# Patient Record
Sex: MALE | Race: WHITE | Employment: OTHER | URBAN - METROPOLITAN AREA
[De-identification: names, ages, dates, MRNs, and addresses within clinical notes are randomized per-mention and may not be internally consistent; named-entity substitution may affect disease eponyms.]

---

## 2024-10-16 RX ORDER — ROSUVASTATIN CALCIUM 10 MG/1
10 TABLET, COATED ORAL NIGHTLY
COMMUNITY

## 2024-10-16 RX ORDER — MULTIVIT-MIN/IRON/FOLIC ACID/K 18-600-40
1 CAPSULE ORAL WEEKLY
COMMUNITY

## 2024-10-16 NOTE — H&P
[facility]  part of [facility]    History and Physical    Mart Vanegas Patient Status:  Hospital Outpatient Surgery    1965 MRN [id number]   Location [facility] OPERATING ROOM Attending Lexa Lopez MD   Hosp Day # 0 PCP No primary care provider on file.     Date:  10/16/2024  Date of Admission:  10/17/24    History provided by:patient  HPI:   No chief complaint on file.    HPI  Juan is here today to discuss his left knee.  He had an ACL reconstruction in the .  He also had a partial meniscectomy.  Currently has lateral joint line pain and recent MRI shows a lateral meniscus tear consistent with the symptoms.  Cartledge surfaces have decreased thickness but no complete bone-on-bone.  We did Scearce view today to confirm no bone-on-bone or greater than 50% joint space narrowing for the medial lateral compartment.  Cortisone injection only gave only few weeks of relief.  He is here to talk about arthroscopy.    He is healthy and in good shape physically.  He lives independently with his wife.  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  He has no allergies and takes no anticoagulation.  No red flags such as chest pain, shortness of breath, history of DVT.  History   No past medical history on file.  No past surgical history on file.  No family history on file.  Social History:  Social History     Socioeconomic History    Marital status:      Allergies/Medications:   Allergies: Allergies[1]  No medications prior to admission.       Review of Systems:   Review of Systems    No fever, chills, night sweats, chest pain, shortness of breath, chest palpitations, nausea, vomiting, or diarrhea.    Physical Exam:   Vital Signs:  There were no vitals taken for this visit.  Physical Exam    Left knee without asymmetric warmth or effusion.  Midline scar consistent with ACL reconstruction using auto Carol bone patella bone.   Healthy skin on the leg with no calf tenderness or pitting edema.  Good motion 0 to 130 degrees.  Tenderness on the posterior lateral joint line.  Artis lima is negative for crepitus and pain and occasionally he will have some medial pain but not today.  His Lachman is positive but his pivot shift is negative and clinically he does not complain of instability.    Previous x-rays as well as today's he review shows good joint space maintenance in all 3 compartments.  There is mild medial narrowing and a show this time today.  Also showed him the MRI again for lateral meniscus tear and the resected medial meniscus.  The ACL is intact but has some degeneration.    Results:   No results found for: \"WBC\", \"HGB\", \"HCT\", \"PLT\", \"CREATSERUM\", \"BUN\", \"NA\", \"K\", \"CL\", \"CO2\", \"GLU\", \"CA\", \"ALB\", \"ALKPHO\", \"BILT\", \"TP\", \"AST\", \"ALT\", \"PTT\", \"INR\", \"PT\", \"T4F\", \"TSH\", \"TSHREFLEX\", \"KAROL\", \"LIP\", \"GGT\", \"PSA\", \"DDIMER\", \"ESRML\", \"ESRPF\", \"CRP\", \"BNP\", \"MG\", \"PHOS\", \"TROP\", \"TROPHS\", \"CK\", \"CKMB\", \"SUJATHA\", \"RPR\", \"B12\", \"ETOH\", \"POCGLU\"  No results found.        Assessment/Plan:     * No active hospital problems. *      1.  Left knee lateral meniscus tear    The patient failed cortisone injection.  He is in good shape physically so weight loss is not an option.  He wishes to proceed with arthroscopy.    We discussed potential risk of surgery such as death, heart attack, stroke, bleeding, infection, blood clot, nerve injury, artery injury, recurrent meniscus tear, and progression of arthritis.  Again Dr. Lopez discussed that we would not be addressing any ACL concerns and we cannot reverse the arthritis with the arthroscopy.  He understood.  He has questions which are answered to his satisfaction.  He wishes to proceed.    The plan will be arthroscopy of the left knee with partial lateral meniscectomy.  We will check the medial meniscus to see if he needs some cleaning however.        ERIKA Alfaro  10/16/2024         [1] No Known  Allergies

## 2024-10-17 ENCOUNTER — HOSPITAL ENCOUNTER (OUTPATIENT)
Facility: HOSPITAL | Age: 59
Setting detail: HOSPITAL OUTPATIENT SURGERY
Discharge: HOME | End: 2024-10-17
Attending: ORTHOPAEDIC SURGERY | Admitting: ORTHOPAEDIC SURGERY
Payer: COMMERCIAL

## 2024-10-17 ENCOUNTER — ANESTHESIA (OUTPATIENT)
Dept: SURGERY | Facility: HOSPITAL | Age: 59
End: 2024-10-17
Payer: COMMERCIAL

## 2024-10-17 ENCOUNTER — ANESTHESIA EVENT (OUTPATIENT)
Dept: SURGERY | Facility: HOSPITAL | Age: 59
End: 2024-10-17
Payer: COMMERCIAL

## 2024-10-17 VITALS
OXYGEN SATURATION: 99 % | RESPIRATION RATE: 16 BRPM | SYSTOLIC BLOOD PRESSURE: 120 MMHG | BODY MASS INDEX: 31.37 KG/M2 | TEMPERATURE: 98 F | HEART RATE: 94 BPM | WEIGHT: 207 LBS | DIASTOLIC BLOOD PRESSURE: 56 MMHG | HEIGHT: 68 IN

## 2024-10-17 DIAGNOSIS — S83.232D COMPLEX TEAR OF MEDIAL MENISCUS OF LEFT KNEE, SUBSEQUENT ENCOUNTER: Primary | ICD-10-CM

## 2024-10-17 PROBLEM — M65.962 SYNOVITIS OF LEFT KNEE: Status: ACTIVE | Noted: 2024-10-17

## 2024-10-17 PROBLEM — M23.42: Status: ACTIVE | Noted: 2024-10-17

## 2024-10-17 PROCEDURE — 0SCD4ZZ EXTIRPATION OF MATTER FROM LEFT KNEE JOINT, PERCUTANEOUS ENDOSCOPIC APPROACH: ICD-10-PCS | Performed by: ORTHOPAEDIC SURGERY

## 2024-10-17 PROCEDURE — 0SBD4ZZ EXCISION OF LEFT KNEE JOINT, PERCUTANEOUS ENDOSCOPIC APPROACH: ICD-10-PCS | Performed by: ORTHOPAEDIC SURGERY

## 2024-10-17 RX ORDER — MORPHINE SULFATE 10 MG/ML
6 INJECTION, SOLUTION INTRAMUSCULAR; INTRAVENOUS EVERY 10 MIN PRN
Status: DISCONTINUED | OUTPATIENT
Start: 2024-10-17 | End: 2024-10-17

## 2024-10-17 RX ORDER — MORPHINE SULFATE 4 MG/ML
2 INJECTION, SOLUTION INTRAMUSCULAR; INTRAVENOUS EVERY 10 MIN PRN
Status: DISCONTINUED | OUTPATIENT
Start: 2024-10-17 | End: 2024-10-17

## 2024-10-17 RX ORDER — METOCLOPRAMIDE HYDROCHLORIDE 5 MG/ML
10 INJECTION INTRAMUSCULAR; INTRAVENOUS ONCE
Status: COMPLETED | OUTPATIENT
Start: 2024-10-17 | End: 2024-10-17

## 2024-10-17 RX ORDER — SODIUM CHLORIDE, SODIUM LACTATE, POTASSIUM CHLORIDE, CALCIUM CHLORIDE 600; 310; 30; 20 MG/100ML; MG/100ML; MG/100ML; MG/100ML
INJECTION, SOLUTION INTRAVENOUS CONTINUOUS
Status: DISCONTINUED | OUTPATIENT
Start: 2024-10-17 | End: 2024-10-17

## 2024-10-17 RX ORDER — IBUPROFEN 200 MG
600 TABLET ORAL EVERY 8 HOURS PRN
Qty: 40 TABLET | Refills: 0 | Status: SHIPPED | OUTPATIENT
Start: 2024-10-17

## 2024-10-17 RX ORDER — MORPHINE SULFATE 4 MG/ML
4 INJECTION, SOLUTION INTRAMUSCULAR; INTRAVENOUS EVERY 10 MIN PRN
Status: DISCONTINUED | OUTPATIENT
Start: 2024-10-17 | End: 2024-10-17

## 2024-10-17 RX ORDER — ROPIVACAINE HYDROCHLORIDE 5 MG/ML
INJECTION, SOLUTION EPIDURAL; INFILTRATION; PERINEURAL AS NEEDED
Status: DISCONTINUED | OUTPATIENT
Start: 2024-10-17 | End: 2024-10-17 | Stop reason: HOSPADM

## 2024-10-17 RX ORDER — ONDANSETRON 2 MG/ML
4 INJECTION INTRAMUSCULAR; INTRAVENOUS EVERY 6 HOURS PRN
Status: CANCELLED | OUTPATIENT
Start: 2024-10-17

## 2024-10-17 RX ORDER — METOCLOPRAMIDE 10 MG/1
10 TABLET ORAL ONCE
Status: COMPLETED | OUTPATIENT
Start: 2024-10-17 | End: 2024-10-17

## 2024-10-17 RX ORDER — FAMOTIDINE 20 MG/1
20 TABLET, FILM COATED ORAL ONCE
Status: COMPLETED | OUTPATIENT
Start: 2024-10-17 | End: 2024-10-17

## 2024-10-17 RX ORDER — HYDRALAZINE HYDROCHLORIDE 20 MG/ML
5 INJECTION INTRAMUSCULAR; INTRAVENOUS EVERY 10 MIN PRN
Status: DISCONTINUED | OUTPATIENT
Start: 2024-10-17 | End: 2024-10-17

## 2024-10-17 RX ORDER — ACETAMINOPHEN 500 MG
1000 TABLET ORAL EVERY 8 HOURS PRN
Qty: 40 TABLET | Refills: 0 | Status: SHIPPED | OUTPATIENT
Start: 2024-10-17

## 2024-10-17 RX ORDER — ASPIRIN 81 MG/1
81 TABLET ORAL 2 TIMES DAILY WITH MEALS
Qty: 24 TABLET | Refills: 0 | Status: SHIPPED | OUTPATIENT
Start: 2024-10-17

## 2024-10-17 RX ORDER — FAMOTIDINE 10 MG/ML
20 INJECTION, SOLUTION INTRAVENOUS ONCE
Status: COMPLETED | OUTPATIENT
Start: 2024-10-17 | End: 2024-10-17

## 2024-10-17 RX ORDER — LIDOCAINE HYDROCHLORIDE 10 MG/ML
INJECTION, SOLUTION EPIDURAL; INFILTRATION; INTRACAUDAL; PERINEURAL AS NEEDED
Status: DISCONTINUED | OUTPATIENT
Start: 2024-10-17 | End: 2024-10-17 | Stop reason: SURG

## 2024-10-17 RX ORDER — HYDROMORPHONE HYDROCHLORIDE 1 MG/ML
0.2 INJECTION, SOLUTION INTRAMUSCULAR; INTRAVENOUS; SUBCUTANEOUS EVERY 5 MIN PRN
Status: DISCONTINUED | OUTPATIENT
Start: 2024-10-17 | End: 2024-10-17

## 2024-10-17 RX ORDER — HYDROMORPHONE HYDROCHLORIDE 1 MG/ML
0.6 INJECTION, SOLUTION INTRAMUSCULAR; INTRAVENOUS; SUBCUTANEOUS EVERY 5 MIN PRN
Status: DISCONTINUED | OUTPATIENT
Start: 2024-10-17 | End: 2024-10-17

## 2024-10-17 RX ORDER — HYDROMORPHONE HYDROCHLORIDE 1 MG/ML
0.4 INJECTION, SOLUTION INTRAMUSCULAR; INTRAVENOUS; SUBCUTANEOUS EVERY 5 MIN PRN
Status: DISCONTINUED | OUTPATIENT
Start: 2024-10-17 | End: 2024-10-17

## 2024-10-17 RX ORDER — ACETAMINOPHEN 500 MG
1000 TABLET ORAL ONCE
Status: COMPLETED | OUTPATIENT
Start: 2024-10-17 | End: 2024-10-17

## 2024-10-17 RX ORDER — NALOXONE HYDROCHLORIDE 0.4 MG/ML
0.08 INJECTION, SOLUTION INTRAMUSCULAR; INTRAVENOUS; SUBCUTANEOUS AS NEEDED
Status: DISCONTINUED | OUTPATIENT
Start: 2024-10-17 | End: 2024-10-17

## 2024-10-17 RX ORDER — TRAMADOL HYDROCHLORIDE 50 MG/1
50 TABLET ORAL EVERY 8 HOURS PRN
Qty: 15 TABLET | Refills: 0 | Status: SHIPPED | OUTPATIENT
Start: 2024-10-17

## 2024-10-17 RX ORDER — HYDRALAZINE HYDROCHLORIDE 20 MG/ML
10 INJECTION INTRAMUSCULAR; INTRAVENOUS EVERY 10 MIN PRN
Status: DISCONTINUED | OUTPATIENT
Start: 2024-10-17 | End: 2024-10-17

## 2024-10-17 RX ORDER — MIDAZOLAM HYDROCHLORIDE 1 MG/ML
INJECTION INTRAMUSCULAR; INTRAVENOUS AS NEEDED
Status: DISCONTINUED | OUTPATIENT
Start: 2024-10-17 | End: 2024-10-17 | Stop reason: SURG

## 2024-10-17 RX ADMIN — LIDOCAINE HYDROCHLORIDE 50 MG: 10 INJECTION, SOLUTION EPIDURAL; INFILTRATION; INTRACAUDAL; PERINEURAL at 17:55:00

## 2024-10-17 RX ADMIN — SODIUM CHLORIDE, SODIUM LACTATE, POTASSIUM CHLORIDE, CALCIUM CHLORIDE: 600; 310; 30; 20 INJECTION, SOLUTION INTRAVENOUS at 19:16:00

## 2024-10-17 RX ADMIN — MIDAZOLAM HYDROCHLORIDE 2 MG: 1 INJECTION INTRAMUSCULAR; INTRAVENOUS at 17:55:00

## 2024-10-17 RX ADMIN — SODIUM CHLORIDE, SODIUM LACTATE, POTASSIUM CHLORIDE, CALCIUM CHLORIDE: 600; 310; 30; 20 INJECTION, SOLUTION INTRAVENOUS at 17:49:00

## 2024-10-17 NOTE — ANESTHESIA PROCEDURE NOTES
Airway  Date/Time: 10/17/2024 6:05 PM  Urgency: Elective      General Information and Staff    Patient location during procedure: OR  Anesthesiologist: Pedro Pablo Shaikh MD  Performed: anesthesiologist   Performed by: Pedro Pablo Shaikh MD  Authorized by: Pedro Pablo Shaikh MD      Indications and Patient Condition  Indications for airway management: anesthesia  Spontaneous ventilation: present  Sedation level: deep  Preoxygenated: yes  Patient position: sniffing  Mask difficulty assessment: 1 - vent by mask    Final Airway Details  Final airway type: supraglottic airway      Successful airway: classic  Size 4       Number of attempts at approach: 1

## 2024-10-17 NOTE — INTERVAL H&P NOTE
Pre-op Diagnosis: Left knee lateral meniscus tear    The above referenced H&P was reviewed by Lexa Lopez MD on 10/17/2024, the patient was examined and no significant changes have occurred in the patient's condition since the H&P was performed.  I discussed with the patient and/or legal representative the potential benefits, risks and side effects of this procedure; the likelihood of the patient achieving goals; and potential problems that might occur during recuperation.  I discussed reasonable alternatives to the procedure, including risks, benefits and side effects related to the alternatives and risks related to not receiving this procedure.  We will proceed with procedure as planned.

## 2024-10-17 NOTE — ANESTHESIA PREPROCEDURE EVALUATION
Anesthesia PreOp Note    HPI:     Mart Vanegas is a 59 year old male who presents for preoperative consultation requested by: Lexa Lopez MD    Date of Surgery: 10/17/2024    Procedure(s):  Arthroscopy left knee, partial lateral meniscectomy  Indication: Left knee lateral meniscus tear    Relevant Problems   No relevant active problems       NPO:  Last Liquid Consumption Date: 10/16/24  Last Liquid Consumption Time: 1800  Last Solid Consumption Date: 10/16/24  Last Solid Consumption Time: 1600  Last Liquid Consumption Date: 10/16/24          History Review:  There are no active problems to display for this patient.      Past Medical History:    High cholesterol    Visual impairment    Reading glasses       Past Surgical History:   Procedure Laterality Date    Cholecystectomy      Other surgical history      kidney stones removed       Prescriptions Prior to Admission[1]  Current Medications and Prescriptions Ordered in Epic[2]    Allergies[3]    Family History   Problem Relation Age of Onset    Diabetes Father     Heart Disorder Mother      Social History     Socioeconomic History    Marital status:    Tobacco Use    Smoking status: Former     Types: Cigarettes    Smokeless tobacco: Never    Tobacco comments:     Quit 35 yrs ago   Vaping Use    Vaping status: Never Used   Substance and Sexual Activity    Alcohol use: Not Currently     Comment: very rarely, last drink 30 yrs ago    Drug use: Never       Available pre-op labs reviewed.             Vital Signs:  Body mass index is 31.47 kg/m².   height is 1.727 m (5' 8\") and weight is 93.9 kg (207 lb). His oral temperature is 98.2 °F (36.8 °C). His blood pressure is 131/74 and his pulse is 67. His respiration is 18 and oxygen saturation is 98%.   Vitals:    10/16/24 0904 10/17/24 1354   BP:  131/74   Pulse:  67   Resp:  18   Temp:  98.2 °F (36.8 °C)   TempSrc:  Oral   SpO2:  98%   Weight: 95.3 kg (210 lb) 93.9 kg (207 lb)   Height: 1.727 m (5' 8\") 1.727 m  (5' 8\")        Anesthesia Evaluation      Airway   Mallampati: I  TM distance: >3 FB  Neck ROM: full  Dental      Pulmonary - normal exam   Cardiovascular - normal exam    Neuro/Psych      GI/Hepatic/Renal      Endo/Other    Abdominal   (+) obese                 Anesthesia Plan:   ASA:  2  Plan:   General  Airway:  LMA  Informed Consent Plan and Risks Discussed With:  Patient      I have informed Mart Vanegas and/or legal guardian or family member of the nature of the anesthetic plan, benefits, risks including possible dental damage if relevant, major complications, and any alternative forms of anesthetic management.   All of the patient's questions were answered to the best of my ability. The patient desires the anesthetic management as planned.  VÍCTOR HUDSON MD  10/17/2024 4:54 PM  Present on Admission:  **None**           [1]   Medications Prior to Admission   Medication Sig Dispense Refill Last Dose/Taking    rosuvastatin 10 MG Oral Tab Take 1 tablet (10 mg total) by mouth nightly.   10/13/2024    Cholecalciferol (VITAMIN D) 50 MCG (2000 UT) Oral Cap Take 1 capsule (2,000 Units total) by mouth once a week. Q Saturday   10/4/2024   [2]   Current Facility-Administered Medications Ordered in Epic   Medication Dose Route Frequency Provider Last Rate Last Admin    lactated ringers infusion   Intravenous Continuous Lexa Lopez MD 20 mL/hr at 10/17/24 1420 New Bag at 10/17/24 1420    ceFAZolin (Ancef) 2g in 10mL IV syringe premix  2 g Intravenous Once Lexa Lopez MD         No current [facility]-ordered outpatient medications on file.   [3] No Known Allergies

## 2024-10-17 NOTE — BRIEF OP NOTE
Pre-Operative Diagnosis: Left knee lateral meniscus tear     Post-Operative Diagnosis: Left knee with 1) medial and lateral meniscus tears, 2) loose body 1 cm, 3) synovitis medial     Procedure Performed: Arthroscopy left knee with 1) partial medial and lateral meniscectomies, 2) excision of loose body, 3) synovectomy medial left knee      Surgeons and Role:     * Lexa Lopez MD - Primary    Assistant(s):  PA: Ros Etienne PA    Anesthesia: Dr. Shaikh with general laryngeal mask anesthesia     Surgical Findings: ACL reconstruction intact but slightly frayed..  Grade II chondromalacia medial compartment and degenerative medial meniscus tear at posterior attachment debrided.  Loose body found in the posterior capsule near medial meniscus posterior  attachment.  Lateral compartment with grade I chondromalacia.  Anterior lateral meniscal tear debrided with ArthroWand and shaver.  Lateral compartment tight. I could not enter posterior aspect..  Patellofemoral joint with grade III chondromalacia.   Tourniquet: 43 minutes at 300 mmHg.  Drain: None  Specimen: None  Complications: None  Estimated Blood Loss: 5 cc  Stable to PACU.    Lexa Lopez MD  10/17/2024  5:58 PM

## 2024-10-18 NOTE — OPERATIVE REPORT
[facility]    PATIENT'S NAME: ISI NOLASCO   ATTENDING PHYSICIAN: Lexa Lopez MD   OPERATING PHYSICIAN: Lexa Lopez MD   PATIENT ACCOUNT#:   [id number]    LOCATION:  [facility] 10  MEDICAL RECORD #:   [id number]       YOB: 1965  ADMISSION DATE:       10/17/2024      OPERATION DATE:  10/17/2024    OPERATIVE REPORT      PREOPERATIVE DIAGNOSIS:  Left knee lateral meniscus tear.  POSTOPERATIVE DIAGNOSIS:  Left knee with:  1.   Medial and lateral meniscal tears.  2.   Loose body, 1 cm.  3.   Synovitis medially.  PROCEDURE:  Arthroscopy of left knee with:  1.   Partial medial and lateral meniscectomies.  2.   Excision of loose body.  3.   Synovectomy, medial left knee.    ASSISTANT:  Ros Etienne PA-C.    ANESTHESIA:  Dr. Shaikh with general laryngeal mask anesthesia.    INDICATIONS:  Patient is a 59-year-old man who had ACL reconstruction in the 1990s.  He has lateral greater than medial joint line pain.  MRI shows anterolateral meniscal tear.  He has failed nonoperative treatment.  We discussed options of potential revision ACL reconstruction versus just simple meniscectomy.  He opted for the latter.  He did not want to proceed with ACL reconstruction should it be necessary at this time.  He does not complain of instability even though on exam, he does have slight instability to his ACL.  The risks and complications of surgery were discussed, and no guarantees were given.  The consent was signed.    OPERATIVE TECHNIQUE:  Patient was brought to the operating room, placed in supine position.  Appropriate IV access and monitors were placed.  Ancef 2 g IV was given as a prophylaxis.  General laryngeal mask anesthesia was performed by Dr. Shaikh.  SCD boot was on the right leg.  Tourniquet and Sinan leg bean placed on the left leg.  The right leg was supported on a padded bump and the end of the table let down.  The left lower extremity was then prepped and draped in  sterile fashion with ChloraPrep.  The leg was exsanguinated, and the tourniquet inflated to 300 mmHg.  Tourniquet time for the case was 43 minutes.  Portals were created, and the suprapatellar pouch was unremarkable.  The patella had grade 3 chondromalacia.  The trochlea was also with grade 3 chondromalacia.  The medial and lateral gutters did not show loose bodies.  The medial compartment was entered.  There was a posterior attachment tear that was debrided with a shaver and ArthroWand.  While we were doing this, the loose body was evident in the posterior capsule.  I was able to grab it with a pituitary, and it measured about 1 cm.  Later, I took a picture of it on a towel with a ruler to document that it was 1 cm.  It was not sent to Pathology.    The ACL reconstruction was intact, although frayed.  Some loose fraying was debrided with the ArthroWand.  It had some integrity, but it was mildly stretched.    The lateral compartment could not be entered.  I switched the scope from the lateral to the medial side and I still could not enter deeply.  There was some tearing of the anterior horn, which I debrided with an ArthroWand.    The medial synovitis that was documented by pictures was debrided with shaver and ArthroWand.    A second look showed no loose fragments.  The equipment was removed and wounds closed with nylon suture.  He was injected with Naropin 0.5% 30 mL for postop pain relief.  Dressings were applied after the wounds were closed  with nylon suture, and the patient was awakened, extubated, and brought to Recovery in stable condition.  Postoperative instructions were given to him in both written and oral form and he will follow up Dr. Lopez in 1 week's time in the office.    Blood loss was 5 mL.  No specimens were sent.  No drains used.  No complications were noted.  Patient tolerated the procedure well.    Dictated By Lexa Lopez MD  d: 10/17/2024 19:06:52  t: 10/18/2024  01:44:28  [facility] [id number]  [facility]/    cc: Kimberlyn Wilkinson MD

## 2024-10-18 NOTE — DISCHARGE INSTRUCTIONS
Starting today, work on bending and straightening the knee.  Pump your feet often to try to prevent blood clots.  You can bear full weight on the left leg with your crutches.  You can stop crutches when you feel comfortable and safe without them.  You can go up and down the stairs when you feel safe on them.  No driving unless you have almost no pain and it is not distracting and have stopped taking tramadol.  When you are not walking, you should have your leg elevated.     Leave bandage on and dry for 2 days. May remove on Saturday . Sponge bath only until dressing is removed.  On Saturday once dressing is removed you may take a regular shower.  Wash wounds gently with soap and water using your hands. Pat dry.  Do not soak.      Apply bacitracin or triple antibiotic ointment and cover with 3 clean Band-Aids.  Ice for 20 minutes at a time.  Once the big bandage is off, use a small washcloth or towel between your skin and the ice.      Take medications as prescribed on discharge instructions.      Call to make an appointment with Dr. Lopez's office for suture removal in 10-14 days.          [facility] HOME CARE INSTRUCTIONS: POST-OP ANESTHESIA  The medication that you received for sedation or general anesthesia can last up to 24 hours. Your judgment and reflexes may be altered, even if you feel like your normal self.      We Recommend:   Do not drive any motor vehicle or bicycle   Avoid mowing the lawn, playing sports, or working with power tools/applicances (power saws, electric knives or mixers)   That you have someone stay with you on your first night home   Do not drink alcohol or take sleeping pills or tranquilizers   Do not sign legal documents within 24 hours of your procedure   If you had a nerve block for your surgery, take extra care not to put any pressure on your arm or hand for 24 hours    It is normal:  For you to have a sore throat if you had a breathing tube during surgery (while you were asleep!).  The sore throat should get better within 48 hours. You can gargle with warm salt water (1/2 tsp in 4 oz warm water) or use a throat lozenge for comfort  To feel muscle aches or soreness especially in the abdomen, chest or neck. The achy feeling should go away in the next 24 hours  To feel weak, sleepy or \"wiped out\". Your should start feeling better in the next 24 hours.   To experience mild discomforts such as sore lip or tongue, headache, cramps, gas pains or a bloated feeling in your abdomen.   To experience mild back pain or soreness for a day or two if you had spinal or epidural anesthesia.   If you had laparoscopic surgery, to feel shoulder pain or discomfort on the day of surgery.   For some patients to have nausea after surgery/anesthesia    If you feel nausea or experience vomiting:   Try to move around less.   Eat less than usual or drink only liquids until the next morning   Nausea should resolve in about 24 hours    If you have a problem when you are at home:    Call your surgeons office     Discharge Instructions: After Your Surgery  You’ve just had surgery. During surgery, you were given medicine called anesthesia to keep you relaxed and free of pain. After surgery, you may have some pain or nausea. This is common. Here are some tips for feeling better and getting well after surgery.   Going home  Your healthcare provider will show you how to take care of yourself when you go home. They'll also answer your questions. Have an adult family member or friend drive you home. For the first 24 hours after your surgery:   Don't drive or use heavy equipment.  Don't make important decisions or sign legal papers.  Take medicines as directed.  Don't drink alcohol.  Have someone stay with you, if needed. They can watch for problems and help keep you safe.  Be sure to go to all follow-up visits with your healthcare provider. And rest after your surgery for as long as your provider tells you to.   Coping with  pain  If you have pain after surgery, pain medicine will help you feel better. Take it as directed, before pain becomes severe. Also, ask your healthcare provider or pharmacist about other ways to control pain. This might be with heat, ice, or relaxation. And follow any other instructions your surgeon or nurse gives you.      Stay on schedule with your medicine.      Tips for taking pain medicine  To get the best relief possible, remember these points:   Pain medicines can upset your stomach. Taking them with a little food may help.  Most pain relievers taken by mouth need at least 20 to 30 minutes to start to work.  Don't wait till your pain becomes severe before you take your medicine. Try to time your medicine so that you can take it before starting an activity. This might be before you get dressed, go for a walk, or sit down for dinner.  Constipation is a common side effect of some pain medicines. Call your healthcare provider before taking any medicines such as laxatives or stool softeners to help ease constipation. Also ask if you should skip any foods. Drinking lots of fluids and eating foods such as fruits and vegetables that are high in fiber can also help. Remember, don't take laxatives unless your surgeon has prescribed them.  Drinking alcohol and taking pain medicine can cause dizziness and slow your breathing. It can even be deadly. Don't drink alcohol while taking pain medicine.  Pain medicine can make you react more slowly to things. Don't drive or run machinery while taking pain medicine.  Your healthcare provider may tell you to take acetaminophen to help ease your pain. Ask them how much you're supposed to take each day. Acetaminophen or other pain relievers may interact with your prescription medicines or other over-the-counter (OTC) medicines. Some prescription medicines have acetaminophen and other ingredients in them. Using both prescription and OTC acetaminophen for pain can cause you to  accidentally overdose. Read the labels on your OTC medicines with care. This will help you to clearly know the list of ingredients, how much to take, and any warnings. It may also help you not take too much acetaminophen. If you have questions or don't understand the information, ask your pharmacist or healthcare provider to explain it to you before you take the OTC medicine.   Managing nausea  Some people have an upset stomach (nausea) after surgery. This is often because of anesthesia, pain, or pain medicine, less movement of food in the stomach, or the stress of surgery. These tips will help you handle nausea and eat healthy foods as you get better. If you were on a special food plan before surgery, ask your healthcare provider if you should follow it while you get better. Check with your provider on how your eating should progress. It may depend on the surgery you had. These general tips may help:   Don't push yourself to eat. Your body will tell you when to eat and how much.  Start off with clear liquids and soup. They're easier to digest.  Next try semi-solid foods as you feel ready. These include mashed potatoes, applesauce, and gelatin.  Slowly move to solid foods. Don’t eat fatty, rich, or spicy foods at first.  Don't force yourself to have 3 large meals a day. Instead eat smaller amounts more often.  Take pain medicines with a small amount of solid food, such as crackers or toast. This helps prevent nausea.  When to call your healthcare provider  Call your healthcare provider right away if you have any of these:   You still have too much pain, or the pain gets worse, after taking the medicine. The medicine may not be strong enough. Or there may be a complication from the surgery.  You feel too sleepy, dizzy, or groggy. The medicine may be too strong.  Side effects such as nausea or vomiting. Your healthcare provider may advise taking other medicines to .  Skin changes such as rash, itching, or hives. This  may mean you have an allergic reaction. Your provider may advise taking other medicines.  The incision looks different (for instance, part of it opens up).  Bleeding or fluid leaking from the incision site, and weren't told to expect that.  Fever of 100.4°F (38°C) or higher, or as directed by your provider.  Call 911  Call 911 right away if you have:   Trouble breathing  Facial swelling     If you have obstructive sleep apnea   You were given anesthesia medicine during surgery to keep you comfortable and free of pain. After surgery, you may have more apnea spells because of this medicine and other medicines you were given. The spells may last longer than normal.    At home:  Keep using the continuous positive airway pressure (CPAP) device when you sleep. Unless your healthcare provider tells you not to, use it when you sleep, day or night. CPAP is a common device used to treat obstructive sleep apnea.  Talk with your provider before taking any pain medicine, muscle relaxants, or sedatives. Your provider will tell you about the possible dangers of taking these medicines.  Contact your provider if your sleeping changes a lot even when taking medicines as directed.  Miki last reviewed this educational content on 10/1/2021  © 1332-9202 [employer]. All rights reserved. This information is not intended as a substitute for professional medical care. Always follow your healthcare professional's instructions.

## 2024-10-18 NOTE — ANESTHESIA POSTPROCEDURE EVALUATION
Patient: Mart Vanegas    Procedure Summary       Date: 10/17/24 Room / Location: [facility] MAIN OR  / [facility] MAIN OR    Anesthesia Start: 1749 Anesthesia Stop:     Procedure: Arthroscopy left knee, medial and lateral meniscectomy; excision of loose body; synovectomy (Left: Knee) Diagnosis: (Left knee lateral meniscus tear; loose body; synovitis of left knee)    Surgeons: Lexa Lopez MD Anesthesiologist: Víctor Hudson MD    Anesthesia Type: general ASA Status: 2            Anesthesia Type: general    Vitals Value Taken Time   /114 10/17/24 1916   Temp 97.4 °F (36.3 °C) 10/17/24 1916   Pulse 114 10/17/24 1917   Resp 19 10/17/24 1916   SpO2 97 % 10/17/24 1916   Vitals shown include unfiled device data.    [facility] AN Post Evaluation:   Patient Evaluated in PACU  Patient Participation: complete - patient participated  Level of Consciousness: awake  Pain Management: adequate  Airway Patency:patent  Dental exam unchanged from preop  Yes    Cardiovascular Status: acceptable  Respiratory Status: acceptable  Postoperative Hydration acceptable      VÍCTOR HUDSON MD  10/17/2024 7:18 PM

## 2024-10-30 ENCOUNTER — HOSPITAL ENCOUNTER (OUTPATIENT)
Dept: ULTRASOUND IMAGING | Facility: HOSPITAL | Age: 59
Discharge: HOME | End: 2024-10-30
Payer: COMMERCIAL

## 2024-10-30 DIAGNOSIS — M79.605 PAIN AND SWELLING OF LOWER EXTREMITY, LEFT: ICD-10-CM

## 2024-10-30 DIAGNOSIS — Z98.890 S/P LEFT KNEE ARTHROSCOPY: ICD-10-CM

## 2024-10-30 DIAGNOSIS — Z98.890 S/P LEFT KNEE ARTHROSCOPY: Primary | ICD-10-CM

## 2024-10-30 DIAGNOSIS — M79.89 PAIN AND SWELLING OF LOWER EXTREMITY, LEFT: ICD-10-CM

## 2024-10-30 PROCEDURE — 93971 EXTREMITY STUDY: CPT

## (undated) DEVICE — BLADE SHV L13CM DIA4MM EXCALIBUR AGG COOLCUT

## (undated) DEVICE — GAMMEX® PI HYBRID SIZE 6.5, STERILE POWDER-FREE SURGICAL GLOVE, POLYISOPRENE AND NEOPRENE BLEND: Brand: GAMMEX

## (undated) DEVICE — Device

## (undated) DEVICE — GAMMEX® PI HYBRID SIZE 7.5, STERILE POWDER-FREE SURGICAL GLOVE, POLYISOPRENE AND NEOPRENE BLEND: Brand: GAMMEX

## (undated) DEVICE — GAMMEX® NON-LATEX PI ORTHO SIZE 8, STERILE POLYISOPRENE POWDER-FREE SURGICAL GLOVE: Brand: GAMMEX

## (undated) DEVICE — ARTHROSCOPY: Brand: MEDLINE INDUSTRIES, INC.

## (undated) DEVICE — MEDI-VAC NON-CONDUCTIVE SUCTION TUBING: Brand: CARDINAL HEALTH

## (undated) DEVICE — DISPOSABLE TOURNIQUET CUFF SINGLE BLADDER, DUAL PORT AND QUICK CONNECT CONNECTOR: Brand: COLOR CUFF

## (undated) DEVICE — SOLUTION IRRIG 3000ML 0.9% NACL FLX CONT

## (undated) DEVICE — GAMMEX® NON-LATEX PI ORTHO SIZE 7, STERILE POLYISOPRENE POWDER-FREE SURGICAL GLOVE: Brand: GAMMEX

## (undated) DEVICE — TUBING PMP L16FT DISP INFLOW

## (undated) DEVICE — GOWN,SIRUS,FAB REINF,RAGLAN,XL,STERILE: Brand: MEDLINE

## (undated) DEVICE — APPLICATOR PREP 26ML CHG 2% ISO ALC 70%

## (undated) DEVICE — SUCTION CANISTER, 3000CC,SAFELINER: Brand: DEROYAL

## (undated) DEVICE — AMBIENT SUPER MULTIVAC 50 WITH                                    INTEGRATED FINGER SWITCHES IFS: Brand: COBLATION

## (undated) DEVICE — SUT ETHLN 3-0 30IN FS-1 NABSRB BLK 24MM 3/8 C